# Patient Record
Sex: FEMALE | URBAN - METROPOLITAN AREA
[De-identification: names, ages, dates, MRNs, and addresses within clinical notes are randomized per-mention and may not be internally consistent; named-entity substitution may affect disease eponyms.]

---

## 2017-06-27 ENCOUNTER — HOSPITAL ENCOUNTER (INPATIENT)
Age: 22
LOS: 3 days | Discharge: HOME | End: 2017-06-30
Attending: OBSTETRICS & GYNECOLOGY | Admitting: OBSTETRICS & GYNECOLOGY
Payer: COMMERCIAL

## 2017-06-27 PROBLEM — O09.30 NO PRENATAL CARE IN CURRENT PREGNANCY: Status: ACTIVE | Noted: 2017-06-27

## 2017-06-27 LAB
ABO + RH BLD: NORMAL
ALBUMIN SERPL BCP-MCNC: 2.5 G/DL (ref 3.5–5)
ALBUMIN/GLOB SERPL: 0.7 {RATIO} (ref 1.1–2.2)
ALP SERPL-CCNC: 173 U/L (ref 45–117)
ALT SERPL-CCNC: 9 U/L (ref 12–78)
AMPHET UR QL SCN: NEGATIVE
ANION GAP BLD CALC-SCNC: 11 MMOL/L (ref 5–15)
APPEARANCE UR: ABNORMAL
AST SERPL W P-5'-P-CCNC: 12 U/L (ref 15–37)
BACTERIA URNS QL MICRO: ABNORMAL /HPF
BARBITURATES UR QL SCN: NEGATIVE
BASOPHILS # BLD AUTO: 0 K/UL (ref 0–0.1)
BASOPHILS # BLD: 0 % (ref 0–1)
BENZODIAZ UR QL: POSITIVE
BILIRUB SERPL-MCNC: 0.2 MG/DL (ref 0.2–1)
BILIRUB UR QL: NEGATIVE
BLOOD GROUP ANTIBODIES SERPL: NORMAL
BUN SERPL-MCNC: 7 MG/DL (ref 6–20)
BUN/CREAT SERPL: 13 (ref 12–20)
CALCIUM SERPL-MCNC: 8.2 MG/DL (ref 8.5–10.1)
CANNABINOIDS UR QL SCN: POSITIVE
CHLORIDE SERPL-SCNC: 108 MMOL/L (ref 97–108)
CO2 SERPL-SCNC: 19 MMOL/L (ref 21–32)
COCAINE UR QL SCN: NEGATIVE
COLOR UR: ABNORMAL
CREAT SERPL-MCNC: 0.55 MG/DL (ref 0.55–1.02)
DRUG SCRN COMMENT,DRGCM: ABNORMAL
EOSINOPHIL # BLD: 0 K/UL (ref 0–0.4)
EOSINOPHIL NFR BLD: 0 % (ref 0–7)
EPITH CASTS URNS QL MICRO: ABNORMAL /LPF
ERYTHROCYTE [DISTWIDTH] IN BLOOD BY AUTOMATED COUNT: 13.7 % (ref 11.5–14.5)
EST. AVERAGE GLUCOSE BLD GHB EST-MCNC: 111 MG/DL
GLOBULIN SER CALC-MCNC: 3.5 G/DL (ref 2–4)
GLUCOSE SERPL-MCNC: 89 MG/DL (ref 65–100)
GLUCOSE UR STRIP.AUTO-MCNC: NEGATIVE MG/DL
GRAN CASTS URNS QL MICRO: ABNORMAL /LPF
HBA1C MFR BLD: 5.5 % (ref 4.2–6.3)
HCT VFR BLD AUTO: 30.2 % (ref 35–47)
HGB BLD-MCNC: 9.4 G/DL (ref 11.5–16)
HGB UR QL STRIP: ABNORMAL
HIV1 P24 AG SERPL QL IA: NONREACTIVE
HIV1+2 AB SERPL QL IA: NONREACTIVE
KETONES UR QL STRIP.AUTO: 40 MG/DL
LEUKOCYTE ESTERASE UR QL STRIP.AUTO: NEGATIVE
LYMPHOCYTES # BLD AUTO: 6 % (ref 12–49)
LYMPHOCYTES # BLD: 0.8 K/UL (ref 0.8–3.5)
MCH RBC QN AUTO: 24.4 PG (ref 26–34)
MCHC RBC AUTO-ENTMCNC: 31.1 G/DL (ref 30–36.5)
MCV RBC AUTO: 78.2 FL (ref 80–99)
METHADONE UR QL: NEGATIVE
MONOCYTES # BLD: 0.8 K/UL (ref 0–1)
MONOCYTES NFR BLD AUTO: 5 % (ref 5–13)
NEUTS SEG # BLD: 13.6 K/UL (ref 1.8–8)
NEUTS SEG NFR BLD AUTO: 89 % (ref 32–75)
NITRITE UR QL STRIP.AUTO: NEGATIVE
OPIATES UR QL: POSITIVE
PCP UR QL: NEGATIVE
PH UR STRIP: 7 [PH] (ref 5–8)
PLATELET # BLD AUTO: 262 K/UL (ref 150–400)
POTASSIUM SERPL-SCNC: 3.8 MMOL/L (ref 3.5–5.1)
PROT SERPL-MCNC: 6 G/DL (ref 6.4–8.2)
PROT UR STRIP-MCNC: 30 MG/DL
RBC # BLD AUTO: 3.86 M/UL (ref 3.8–5.2)
RBC #/AREA URNS HPF: >100 /HPF (ref 0–5)
RPR SER QL: NONREACTIVE
SODIUM SERPL-SCNC: 138 MMOL/L (ref 136–145)
SP GR UR REFRACTOMETRY: 1.01 (ref 1–1.03)
SPECIMEN EXP DATE BLD: NORMAL
UA: UC IF INDICATED,UAUC: ABNORMAL
UROBILINOGEN UR QL STRIP.AUTO: 0.2 EU/DL (ref 0.2–1)
WBC # BLD AUTO: 15.2 K/UL (ref 3.6–11)
WBC URNS QL MICRO: ABNORMAL /HPF (ref 0–4)

## 2017-06-27 PROCEDURE — 87389 HIV-1 AG W/HIV-1&-2 AB AG IA: CPT | Performed by: OBSTETRICS & GYNECOLOGY

## 2017-06-27 PROCEDURE — 87340 HEPATITIS B SURFACE AG IA: CPT | Performed by: OBSTETRICS & GYNECOLOGY

## 2017-06-27 PROCEDURE — 65410000002 HC RM PRIVATE OB

## 2017-06-27 PROCEDURE — 80053 COMPREHEN METABOLIC PANEL: CPT | Performed by: OBSTETRICS & GYNECOLOGY

## 2017-06-27 PROCEDURE — 86592 SYPHILIS TEST NON-TREP QUAL: CPT | Performed by: OBSTETRICS & GYNECOLOGY

## 2017-06-27 PROCEDURE — 74011250637 HC RX REV CODE- 250/637: Performed by: OBSTETRICS & GYNECOLOGY

## 2017-06-27 PROCEDURE — 74011250636 HC RX REV CODE- 250/636

## 2017-06-27 PROCEDURE — 36415 COLL VENOUS BLD VENIPUNCTURE: CPT | Performed by: OBSTETRICS & GYNECOLOGY

## 2017-06-27 PROCEDURE — 87491 CHLMYD TRACH DNA AMP PROBE: CPT | Performed by: OBSTETRICS & GYNECOLOGY

## 2017-06-27 PROCEDURE — 85025 COMPLETE CBC W/AUTO DIFF WBC: CPT | Performed by: OBSTETRICS & GYNECOLOGY

## 2017-06-27 PROCEDURE — 87086 URINE CULTURE/COLONY COUNT: CPT | Performed by: OBSTETRICS & GYNECOLOGY

## 2017-06-27 PROCEDURE — 86762 RUBELLA ANTIBODY: CPT | Performed by: OBSTETRICS & GYNECOLOGY

## 2017-06-27 PROCEDURE — 87798 DETECT AGENT NOS DNA AMP: CPT | Performed by: OBSTETRICS & GYNECOLOGY

## 2017-06-27 PROCEDURE — 0HQ9XZZ REPAIR PERINEUM SKIN, EXTERNAL APPROACH: ICD-10-PCS | Performed by: OBSTETRICS & GYNECOLOGY

## 2017-06-27 PROCEDURE — 75410000003 HC RECOV DEL/VAG/CSECN EA 0.5 HR

## 2017-06-27 PROCEDURE — 86803 HEPATITIS C AB TEST: CPT | Performed by: OBSTETRICS & GYNECOLOGY

## 2017-06-27 PROCEDURE — 74011000250 HC RX REV CODE- 250

## 2017-06-27 PROCEDURE — 75410000004 HC DELIVERY OUTSIDE HOSPITAL

## 2017-06-27 PROCEDURE — 74011250636 HC RX REV CODE- 250/636: Performed by: OBSTETRICS & GYNECOLOGY

## 2017-06-27 PROCEDURE — 83036 HEMOGLOBIN GLYCOSYLATED A1C: CPT | Performed by: OBSTETRICS & GYNECOLOGY

## 2017-06-27 PROCEDURE — 80307 DRUG TEST PRSMV CHEM ANLYZR: CPT | Performed by: OBSTETRICS & GYNECOLOGY

## 2017-06-27 PROCEDURE — 81001 URINALYSIS AUTO W/SCOPE: CPT | Performed by: OBSTETRICS & GYNECOLOGY

## 2017-06-27 PROCEDURE — 86900 BLOOD TYPING SEROLOGIC ABO: CPT | Performed by: OBSTETRICS & GYNECOLOGY

## 2017-06-27 RX ORDER — OXYTOCIN 10 [USP'U]/ML
20 INJECTION, SOLUTION INTRAMUSCULAR; INTRAVENOUS ONCE
Status: COMPLETED | OUTPATIENT
Start: 2017-06-27 | End: 2017-06-27

## 2017-06-27 RX ORDER — ONDANSETRON 4 MG/1
4 TABLET, ORALLY DISINTEGRATING ORAL
Status: DISCONTINUED | OUTPATIENT
Start: 2017-06-27 | End: 2017-06-30 | Stop reason: HOSPADM

## 2017-06-27 RX ORDER — OXYTOCIN/RINGER'S LACTATE 20/1000 ML
PLASTIC BAG, INJECTION (ML) INTRAVENOUS
Status: DISPENSED
Start: 2017-06-27 | End: 2017-06-27

## 2017-06-27 RX ORDER — HYDROCORTISONE ACETATE PRAMOXINE HCL 2.5; 1 G/100G; G/100G
CREAM TOPICAL AS NEEDED
Status: DISCONTINUED | OUTPATIENT
Start: 2017-06-27 | End: 2017-06-30 | Stop reason: HOSPADM

## 2017-06-27 RX ORDER — TRAMADOL HYDROCHLORIDE 50 MG/1
50 TABLET ORAL
Status: DISCONTINUED | OUTPATIENT
Start: 2017-06-27 | End: 2017-06-30 | Stop reason: HOSPADM

## 2017-06-27 RX ORDER — ZOLPIDEM TARTRATE 5 MG/1
5 TABLET ORAL
Status: DISCONTINUED | OUTPATIENT
Start: 2017-06-27 | End: 2017-06-30 | Stop reason: HOSPADM

## 2017-06-27 RX ORDER — HYDROCORTISONE 1 %
CREAM (GRAM) TOPICAL AS NEEDED
Status: DISCONTINUED | OUTPATIENT
Start: 2017-06-27 | End: 2017-06-30 | Stop reason: HOSPADM

## 2017-06-27 RX ORDER — SODIUM CHLORIDE 0.9 % (FLUSH) 0.9 %
5-10 SYRINGE (ML) INJECTION AS NEEDED
Status: DISCONTINUED | OUTPATIENT
Start: 2017-06-27 | End: 2017-06-30 | Stop reason: HOSPADM

## 2017-06-27 RX ORDER — OXYTOCIN 10 [USP'U]/ML
INJECTION, SOLUTION INTRAMUSCULAR; INTRAVENOUS
Status: COMPLETED
Start: 2017-06-27 | End: 2017-06-27

## 2017-06-27 RX ORDER — DOCUSATE SODIUM 100 MG/1
100 CAPSULE, LIQUID FILLED ORAL
Status: DISCONTINUED | OUTPATIENT
Start: 2017-06-27 | End: 2017-06-30 | Stop reason: HOSPADM

## 2017-06-27 RX ORDER — IBUPROFEN 400 MG/1
800 TABLET ORAL EVERY 8 HOURS
Status: DISCONTINUED | OUTPATIENT
Start: 2017-06-27 | End: 2017-06-29

## 2017-06-27 RX ORDER — LIDOCAINE HYDROCHLORIDE 10 MG/ML
10 INJECTION INFILTRATION; PERINEURAL ONCE
Status: ACTIVE | OUTPATIENT
Start: 2017-06-27 | End: 2017-06-27

## 2017-06-27 RX ORDER — OXYTOCIN/RINGER'S LACTATE 20/1000 ML
125-1000 PLASTIC BAG, INJECTION (ML) INTRAVENOUS AS NEEDED
Status: DISCONTINUED | OUTPATIENT
Start: 2017-06-27 | End: 2017-06-30 | Stop reason: HOSPADM

## 2017-06-27 RX ORDER — LIDOCAINE HYDROCHLORIDE 10 MG/ML
INJECTION INFILTRATION; PERINEURAL
Status: COMPLETED
Start: 2017-06-27 | End: 2017-06-27

## 2017-06-27 RX ORDER — AMMONIA 15 % (W/V)
1 AMPUL (EA) INHALATION AS NEEDED
Status: DISCONTINUED | OUTPATIENT
Start: 2017-06-27 | End: 2017-06-30 | Stop reason: HOSPADM

## 2017-06-27 RX ORDER — SODIUM CHLORIDE, SODIUM LACTATE, POTASSIUM CHLORIDE, CALCIUM CHLORIDE 600; 310; 30; 20 MG/100ML; MG/100ML; MG/100ML; MG/100ML
125 INJECTION, SOLUTION INTRAVENOUS CONTINUOUS
Status: DISCONTINUED | OUTPATIENT
Start: 2017-06-27 | End: 2017-06-30 | Stop reason: HOSPADM

## 2017-06-27 RX ORDER — SODIUM CHLORIDE 0.9 % (FLUSH) 0.9 %
5-10 SYRINGE (ML) INJECTION EVERY 8 HOURS
Status: DISCONTINUED | OUTPATIENT
Start: 2017-06-27 | End: 2017-06-30 | Stop reason: HOSPADM

## 2017-06-27 RX ORDER — DIPHENHYDRAMINE HCL 25 MG
25 CAPSULE ORAL
Status: DISCONTINUED | OUTPATIENT
Start: 2017-06-27 | End: 2017-06-30 | Stop reason: HOSPADM

## 2017-06-27 RX ORDER — SIMETHICONE 80 MG
80 TABLET,CHEWABLE ORAL
Status: DISCONTINUED | OUTPATIENT
Start: 2017-06-27 | End: 2017-06-30 | Stop reason: HOSPADM

## 2017-06-27 RX ORDER — ACETAMINOPHEN 325 MG/1
650 TABLET ORAL
Status: DISCONTINUED | OUTPATIENT
Start: 2017-06-27 | End: 2017-06-30 | Stop reason: HOSPADM

## 2017-06-27 RX ADMIN — IBUPROFEN 800 MG: 400 TABLET, FILM COATED ORAL at 20:05

## 2017-06-27 RX ADMIN — OXYTOCIN 20 UNITS: 10 INJECTION INTRAVENOUS at 09:56

## 2017-06-27 RX ADMIN — SODIUM CHLORIDE, SODIUM LACTATE, POTASSIUM CHLORIDE, AND CALCIUM CHLORIDE 125 ML/HR: 600; 310; 30; 20 INJECTION, SOLUTION INTRAVENOUS at 09:45

## 2017-06-27 RX ADMIN — OXYTOCIN 20 UNITS: 10 INJECTION, SOLUTION INTRAMUSCULAR; INTRAVENOUS at 09:56

## 2017-06-27 RX ADMIN — TRAMADOL HYDROCHLORIDE 50 MG: 50 TABLET, FILM COATED ORAL at 14:55

## 2017-06-27 RX ADMIN — LIDOCAINE HYDROCHLORIDE: 10 INJECTION, SOLUTION INFILTRATION; PERINEURAL at 10:15

## 2017-06-27 RX ADMIN — IBUPROFEN 800 MG: 400 TABLET, FILM COATED ORAL at 12:02

## 2017-06-27 NOTE — LACTATION NOTE
This note was copied from a baby's chart. This mother has been advised not to breastfeed due to:  Substance abuse and/or alcohol abuse,  Cannabis, Benzodiazepine, opiates  Mother may choose to pump and dump her milk until she is drug free, approximately one month   Mother agrees to formula feed at this time. Parents will explore feeding goals and communicate with staff.

## 2017-06-27 NOTE — IP AVS SNAPSHOT
[address] 
[phone] Patient: Stefano Bowman MRN: [id number] You are allergic to the following No active allergies Immunizations Administered for This Admission Name Date MMR  Deferred () Tdap 6/28/2017 Recent Documentation Height Weight Breastfeeding? BMI OB Status Smoking Status 1.575 m 73.9 kg Unknown 29.81 kg/m2 Recent pregnancy Current Every Day Smoker Emergency Contacts Name Discharge Info Relation Home Work Mobile Corie Longoria About your hospitalization You were admitted on:  June 27, 2017 You last received care in the:  [address] You were discharged on:  June 30, 2017 Unit phone number:  [phone] Why you were hospitalized Your primary diagnosis was:  Not on File Your diagnoses also included:  No Prenatal Care In Current Pregnancy Providers Seen During Your Hospitalizations Provider Role Specialty Primary office phone Lamonte Vasquez MD Attending Provider Obstetrics & Gynecology [phone] Your Primary Care Physician (PCP) Primary Care Physician Office Phone Office Fax NONE ** None ** ** None ** Follow-up Information Follow up With Details Comments Contact Info None   None ([phone]) Patient stated that they have no PCP [facility] (Early Intervention) Call in 2 weeks Early Intervention services for infant. Please call if you have not heard from [facility] within two weeks. [phone] THE [facility] office  Schedule an appointment as soon as possible for a visit Call to schedule [facility] appointment for nutrition program.  [address] [phone]  [facility]  Schedule an appointment as soon as possible for a visit Please contact to schedule substance use counseling if needed. Mary Ann Calloway, [place], [address] Phone: [phone] [facility]  Call as needed with questions regarding Medicaid status. [phone] Jed Farah MD Schedule an appointment as soon as possible for a visit in 6 weeks  [address] [facility] [place] 
[phone] Current Discharge Medication List  
  
START taking these medications Dose & Instructions Dispensing Information Comments Morning Noon Evening Bedtime  
 ibuprofen 800 mg tablet Commonly known as:  MOTRIN Your last dose was: Your next dose is:    
   
   
 Dose:  800 mg Take 1 Tab by mouth every six (6) hours as needed. Quantity:  30 Tab Refills:  0 Where to Get Your Medications Information on where to get these meds will be given to you by the nurse or doctor. ! Ask your nurse or doctor about these medications  
  ibuprofen 800 mg tablet Discharge Instructions POSTPARTUM DISCHARGE INSTRUCTIONS Name:  On license of [facility] YOB: 1995 Admission Diagnosis:  No prenatal care in current pregnancy, third trimester Discharge Diagnosis:   
Problem List as of 6/30/2017  Never Reviewed Codes Class Noted - Resolved No prenatal care in current pregnancy ICD-10-CM: O09.30 ICD-9-CM: V23.7  6/27/2017 - Present Attending Physician:  Gentry Schuster MD 
 
Delivery Type:  Vaginal Childbirth: What To Expect At Home Your Recovery: Your body will slowly heal in the next few weeks. It is easy to get too tired and overwhelmed during the first weeks after your baby is born. Changes in your hormones can shift your mood without warning. You may find it hard to meet the extra demands on your energy and time. Take it easy on yourself. Follow-up care is a key part of your treatment and safety. Be sure to make and go to all appointments, and call your doctor if you are having problems. It's also a good idea to know your test results and keep a list of the medicines you take. How can you care for yourself at home? Vaginal bleeding and cramps · After delivery, you will have a bloody discharge from the vagina. This will turn pink within a week and then white or yellow after about 10 days. It may last for 2 to 4 weeks or longer, until the uterus has healed. Use pads instead of tampons until you stop bleeding. · Do not worry if you pass some blood clots, as long as they are smaller than a golf ball. If you have a tear or stitches in your vaginal area, change the pad at least every 4 hours to prevent soreness and infection. · You may have cramps for the first few days after childbirth. These are normal and occur as the uterus shrinks to normal size. Take an over-the-counter pain medicine, such as acetaminophen (Tylenol), ibuprofen (Advil, Motrin), or naproxen (Aleve), for cramps. Read and follow all instructions on the label. Do not take aspirin, because it can cause more bleeding. Do not take acetaminophen (Tylenol) and other acetaminophen containing medications (i.e. Percocet) at the same time. Breast fullness · Your breasts may overfill (engorge) in the first few days after delivery. To help milk flow and to relieve pain, warm your breasts in the shower or by using warm, moist towels before nursing. · If you are not nursing, do not put warmth on your breasts or touch your breasts. Wear a tight bra or sports bra and use ice until the fullness goes away. This usually takes 2 to 3 days. · Put ice or a cold pack on your breast after nursing to reduce swelling and pain. Put a thin cloth between the ice and your skin. Activity · Eat a balanced diet. Do not try to lose weight by cutting calories.  Keep taking your prenatal vitamins, or take a multivitamin. · Get as much rest as you can. Try to take naps when your baby sleeps during the day. · Get some exercise every day. But do not do any heavy exercise until your doctor says it is okay. · Wait until you are healed (about 4 to 6 weeks) before you have sexual intercourse. Your doctor will tell you when it is okay to have sex. · Talk to your doctor about birth control. You can get pregnant even before your period returns. Also, you can get pregnant while you are breast-feeding. Mental Health · Many women get the \"baby blues\" during the first few days after childbirth. You may lose sleep, feel irritable, and cry easily. You may feel happy one minute and sad the next. Hormone changes are one cause of these emotional changes. Also, the demands of a new baby, along with visits from relatives or other family needs, add to a mother's stress. The \"baby blues\" often peak around the fourth day. Then they ease up in less than 2 weeks. · If your moodiness or anxiety lasts for more than 2 weeks, or if you feel like life is not worth living, you may have postpartum depression. This is different for each mother. Some mothers with serious depression may worry intensely about their infant's well-being. Others may feel distant from their child. Some mothers might even feel that they might harm their baby. A mother may have signs of paranoia, wondering if someone is watching her. · With all the changes in your life, you may not know if you are depressed. Pregnancy sometimes causes changes in how you feel that are similar to the symptoms of depression. · Symptoms of depression include: · Feeling sad or hopeless and losing interest in daily activities. These are the most common symptoms of depression. · Sleeping too much or not enough. · Feeling tired. You may feel as if you have no energy. · Eating too much or too little. · POSTPARTUM SUPPORT INTERNATIONAL (PSI) offers a Warm line; Chat with the Expert phone sessions; Information and Articles about Pregnancy and Postpartum Mood Disorders; Comprehensive List of Free Support Groups; Knowledgeable local coordinators who will offer support, information, and resources; Guide to Resources on Shoot it!; Calendar of events in the  mood disorders community; Latest News and Research; and [facility] [address] for [employer]. Remember - You are not alone; You are not to blame; With help, you will be well. 1-118-869-PPD(7607). WWW. POSTPARTUM. NET · Writing or talking about death, such as writing suicide notes or talking about guns, knives, or pills. Keep the numbers for these national suicide hotlines: 2-362-582-TALK (7-311.426.2777) and 0-462-LEDDDJX (4-108.537.5942). If you or someone you know talks about suicide or feeling hopeless, get help right away. Constipation and Hemorrhoids · Drink plenty of fluids, enough so that your urine is light yellow or clear like water. If you have kidney, heart, or liver disease and have to limit fluids, talk with your doctor before you increase the amount of fluids you drink. · Eat plenty of fiber each day. Have a bran muffin or bran cereal for breakfast, and try eating a piece of fruit for a mid-afternoon snack. · For painful, itchy hemorrhoids, put ice or a cold pack on the area several times a day for 10 minutes at a time. Follow this by putting a warm compress on the area for another 10 to 20 minutes or by sitting in a shallow, warm bath. When should you call for help? Call 911 anytime you think you may need emergency care. For example, call if: 
· You are thinking of hurting yourself, your baby, or anyone else. · You passed out (lost consciousness). · You have symptoms of a blood clot in your lung (called a pulmonary embolism). These may include:   
· Sudden chest pain. · Trouble breathing. · Coughing up blood. Call your doctor now or seek immediate medical care if: 
· You have severe vaginal bleeding. · You are soaking through a pad each hour for 2 or more hours. · Your vaginal bleeding seems to be getting heavier or is still bright red 4 days after delivery. · You are dizzy or lightheaded, or you feel like you may faint. · You are vomiting or cannot keep fluids down. · You have a fever. · You have new or more belly pain. · You pass tissue (not just blood). · Your vaginal discharge smells bad. · Your belly feels tender or full and hard. · Your breasts are continuously painful or red. · You feel sad, anxious, or hopeless for more than a few days. · You have sudden, severe pain in your belly. · You have symptoms of a blood clot in your leg (called a deep vein thrombosis),  
       such as: 
· Pain in your calf, back of the knee, thigh, or groin. · Redness and swelling in your leg or groin. · You have symptoms of preeclampsia, such as: 
· Sudden swelling of your face, hands, or feet. · New vision problems (such as dimness or blurring). · A severe headache. · Your blood pressure is higher than it should be or rises suddenly. · You have new nausea or vomiting. Watch closely for changes in your health, and be sure to contact your doctor if you have any problems. Additional Information:  Postpartum Support PARENTS:  Are you feeling sad or depressed? Is it difficult for you to enjoy yourself? Do you feel more irritable or tense? Do you feel anxious or panicky? Are you having difficulty bonding with your baby? Do you feel as if you are \"out of control\" or \"going crazy\"? Are you worried that you might hurt your baby or yourself? FAMILIES: Do you worry that something is wrong but don't know how to help? Do you think that your partner or spouse is having problems coping? Are you worried that it may never get better?   
 
While many women experience some mild mood change or \"the blues\" during or after the birth of a child, 1 in 9 women experience more significant symptoms of depression or anxiety. 1 in 10 Dads become depressed during the first year. Things you can do Being a good parent includes taking care of yourself. If you take care of yourself, you will be able to take better care of your baby and your family. · Talk to a counselor or healthcare provider who has training in  mood and anxiety problems. · Learn as much as you can about pregnancy and postpartum depression and anxiety. · Get support from family and friends. Ask for help when you need it. · Join a support group in your area or online. · Keep active by walking, stretching or whatever form of exercise helps you to feel better. · Get enough rest and time for yourself. · Eat a healthy diet. · Don't give up! It may take more than one try to get the right help you need. These are general instructions for a healthy lifestyle: No smoking/ No tobacco products/ Avoid exposure to second hand smoke Surgeon General's Warning:  Quitting smoking now greatly reduces serious risk to your health. Obesity, smoking, and sedentary lifestyle greatly increases your risk for illness A healthy diet, regular physical exercise & weight monitoring are important for maintaining a healthy lifestyle Recognize signs and symptoms of STROKE: 
 
F-face looks uneven A-arms unable to move or move unevenly S-speech slurred or non-existent T-time-call 911 as soon as signs and symptoms begin - DO NOT go  
    back to bed or wait to see if you get better - TIME IS BRAIN. I have had the opportunity to make my options or choices for discharge. I have received and understand these instructions. Discharge Orders None [facility] Announcement We are excited to announce that we are making your provider's discharge notes available to you in [facility]ali[facility].   You will see these notes when they are completed and signed by the physician that discharged you from your recent hospital stay. If you have any questions or concerns about any information you see in Hapara, please call the Health Information Department where you were seen or reach out to your Primary Care Provider for more information about your plan of care. Introducing [address]! Alirio Cruz introduces Hapara patient portal. Now you can access parts of your medical record, email your doctor's office, and request medication refills online. 1. In your internet browser, go to [email or web address] 2. Click on the First Time User? Click Here link in the Sign In box. You will see the New Member Sign Up page. 3. Enter your Hapara Access Code exactly as it appears below. You will not need to use this code after youve completed the sign-up process. If you do not sign up before the expiration date, you must request a new code. · Hapara Access Code: [email or web address] Expires: 9/28/2017 11:06 AM 
 
4. Enter the last four digits of your Social Security Number (xxxx) and Date of Birth (mm/dd/yyyy) as indicated and click Submit. You will be taken to the next sign-up page. 5. Create a Hapara ID. This will be your Hapara login ID and cannot be changed, so think of one that is secure and easy to remember. 6. Create a Hapara password. You can change your password at any time. 7. Enter your Password Reset Question and Answer. This can be used at a later time if you forget your password. 8. Enter your e-mail address. You will receive e-mail notification when new information is available in [address] 9. Click Sign Up. You can now view and download portions of your medical record. 10. Click the Download Summary menu link to download a portable copy of your medical information.  
 
If you have questions, please visit the Frequently Asked Questions section of the [facility]. Remember, MyChart is NOT to be used for urgent needs. For medical emergencies, dial 911. Now available from your iPhone and Android! General Information Please provide this summary of care documentation to your next provider. Patient Signature:  ____________________________________________________________ Date:  ____________________________________________________________  
  
Bibiana John Provider Signature:  ____________________________________________________________ Date:  ____________________________________________________________

## 2017-06-27 NOTE — PROGRESS NOTES
Chart reviewed. CM consult received. Pt admitted earlier today s/p home delivery of infant in bathroom toilet. No prenatal care. Per chart review and staff, pt was unaware of pregnancy. Met with pt and FOWIL Atul Owens [phone]) at the bedside this PM to introduce role of case management, offer support ,and address needs of mother/infant post discharge. Pt is 24years old and has been living with her boyfriend/FOB in an apartment on [address]. Pt stated she is currently not in school nor is she working. Pt is on her parent's insurance plan [employer]). Pt plans to add infant to Medicaid policy- [employer] worker has already met with pt to discuss this and pt signed consent. CASSANDRA Da Silva is a full time student at [employer], [address] [facility]. He works at [employer] and [employer] part time (full time during the summer). Trung Da Silva already reached out to employer to request the next few days off of work to be with pt/infant. Trung Da Silva is moving to a new apartment ([address]) and pt plans to return to her parent's house post discharge with infant. ([address], [address] [phone]). Pt identifies support from her parents, two sisters, grandmother, and cousins. Maternal family has been present at the bedside since admission. CASSANDRA's parents are supportive- Trung Da Silva stated Charisma Perches are disappointed but that they will always support me no matter what. \" CASSANDRA's parents live in the [facility]. Pt reports that she did not know she was pregnant. Pt stated she has been having regular menstrual cycles for the past few months. Pt stated that she had gained weight but did not suspect pregnancy. She also noted bouts of nausea periodically throughout the past couple of months, but reported it was \"clear foam\". No contraception used. Pt stated late last night she experienced severe low back pain and started her menstrual cycle. This morning she took a Morphine 60MG tablet to alleviate her pain.  CM confirmed that pt is not prescribed to Morphine or Xanax. When asked where pt obtained these medications, she stated she got it from a random person. Pt reported last using Morphine tablet this AM and reports taking 2-3 tablets recently. Pt also confirmed she had taken a Xanax a few days ago. FOB also spoke up and stated that he takes Xanax as well. Pt admits to smoking marijuana daily. CM informed pt/FOB of protocol for urine drug screening for mother/infant. CM will make referral to [address] [place] [address] for pt and continue to monitor infant's screening. If infant's screen is positive, will contact CPS and file report. Pt/FOB aware of this. Emotional support given. CM provided opportunity for questions/concerns. None voiced at this time. Pt is considering breastfeeding infant as she did so this morning. CM advised pt to contact her insurance company to inquire about a breastpump as soon as possible. Pt's cousin plans to give pt/FOB a car seat that her infant has outgrown. Disposition plan is for mother/infant to return to maternal grandparent's home. Pt is unsure of how long she will stay there. CM offered additional resources/support to consider: Family Lifeline and [employer]. CM will follow up with pt in AM to continue discussion and assist with establishing pediatrician. Care Management Interventions  Mode of Transport at Discharge: Other (see comment) (family by car)  Transition of Care Consult (CM Consult): Discharge Planning  MyChart Signup: No  Discharge Durable Medical Equipment: No  Health Maintenance Reviewed: Yes  Physical Therapy Consult: No  Occupational Therapy Consult: No  Speech Therapy Consult: No  Current Support Network:  Other, Family [facility] [place] (has been living with boyfriend- will be moving back to [facility] house )  Confirm Follow Up Transport: Family  Plan discussed with Pt/Family/Caregiver: Yes  Freedom of Choice Offered: Yes  Discharge Location  Discharge Placement: Home with family assistance    Mayelin Men, MSW

## 2017-06-27 NOTE — ROUTINE PROCESS
TRANSFER - IN REPORT:    Verbal report received from CHRISTIANO Jc RN on [employer]  being received from L&D for routine progression of care      Report consisted of patients Situation, Background, Assessment and   Recommendations(SBAR). Information from the following report(s) SBAR was reviewed with the receiving nurse. Opportunity for questions and clarification was provided. Assessment completed upon patients arrival to unit and care assumed. Hourly rounds completed from [id number].

## 2017-06-27 NOTE — H&P
Labor and Delivery Admission Note  6/27/2017    24 y.o. female, G1 P 1 @ uncertain GA secondary to unknown pregnancy and no prenatal care presented via EMS @ 6233 having delivered at home. Patient reports having regular bleeding episodes monthly usually around the 28th of the month. At the prompting of her mother secondary to some weight gain a few months ago, she did 3 home pregnancy tests, all of which were negative. She noted some heavier bleeding yesterday associated with back pain. She took some morphine from a \"friend\". The back pain progressed and she went to have a BM this AM and delivered a crying baby into the toilet. Her BF took the baby out of the toilet and called 911. Patient presents to L&D now with placenta still in utero,complaining of cramping. PNC: no PNC    PMH:  Negative    PSH:  Negative    Meds: occ ibuprofen    All:  NKDA    SH:  Lives with her BF Halie Benavides); works at [employer]; smokes tobacco and marijuana; she has occ taken morphine from friends; denies any other drug use; has rare alcohol intake    GYN:  Denies any h/o STDs      History reviewed. No pertinent past medical history. History reviewed. No pertinent surgical history. OB/GYN: none  Meds:   Current Facility-Administered Medications   Medication Dose Route Frequency    oxytocin in lactated ringers 20 unit/1,000 mL infusion soln        lidocaine (XYLOCAINE) 10 mg/mL (1 %) injection 10 mL  10 mL SubCUTAneous ONCE    lactated Ringers infusion  125 mL/hr IntraVENous CONTINUOUS     Allergies: No Known Allergies  Pertinent ROS: as per HPI   History reviewed. No pertinent family history. Social History     Social History    Marital status: SINGLE     Spouse name: N/A    Number of children: N/A    Years of education: N/A     Occupational History    Not on file.      Social History Main Topics    Smoking status: Current Every Day Smoker     Packs/day: 0.25    Smokeless tobacco: Never Used    Alcohol use No    Drug use: 7.00 per week     Special: Marijuana      Comment: daily    Sexual activity: Yes     Partners: Male     Other Topics Concern    Not on file     Social History Narrative       OBJECTIVE:  Gravid female NAD  Temp (24hrs), Av.4 °F (36.9 °C), Min:98.4 °F (36.9 °C), Max:98.4 °F (36.9 °C)    Visit Vitals    /87    Pulse 95    Temp 98.4 °F (36.9 °C)    Resp 16    Ht 5' 2\" (1.575 m)    Wt 73.9 kg (163 lb)    BMI 29.81 kg/m2       Labs:  No results found for: WBC    Exam:  HEENT:  normal   Lungs:  Normal work of breathing  Cor:  Normal perfusion  Abdomen:  Soft, firm fundus, nontender  :  Placenta delivered, intact, 3VC          Several hemostatic abrasions          1 1st degree vaginal lac, nonhemostatic, repaired with 3.0 monocryl    Impression:  PPD 0  at home; delivery of placenta with vag repair in hospital    --SW consult  --PNL drawn with UDS  --Patient was in shock but very appropriate with baby; hasn't had a chance to consider adoption vs keeping the baby; she has  and is considering names  --otherwise routine PP care     Daly Ayers MD

## 2017-06-27 NOTE — PROGRESS NOTES
Bedside and Verbal shift change report given to [address] [place], [place] [address]way 951 (oncoming nurse) by Jamil Campos RN (offgoing nurse). Report included the following information SBAR, Kardex and MAR.     1700-patient ambulated to bathroom, gait steady. Voided a large amount of clear yellow urine. Educated on [employer] and pericare completed.      2000-hourly rounds completed from 3770-4787    2400-hourly rounds completed from 3049-7865

## 2017-06-27 NOTE — PROGRESS NOTES
0939-pt here from home, pt was unaware of pregnancy, delivered baby vaginally in bathroom toilet at home, boyfriend quickly retreived baby and called 46, baby cried immediately, baby arrived pink and vigorous, carried by paramedic  0944-placenta delivered by Dr Edwin Gregorio  1000-Repair of first degree tear with lidocaine  1030-breastfeeding well, hand expressed milk at well for baby at breast  1100-family at side  1130- up to void and collect urine specimen, pericare done for collection, new ice pack, gait steady  1207-to MIU in wheelchair, pt has no complaints, family and baby at side  1210-baby to nursery for pediatrician visit and warmer  1215-TRANSFER - OUT REPORT:    Verbal report given to Marichuy Whitten RN(name) on Cezar Rodriguez  being transferred to [facility]unit) for routine progression of care       Report consisted of patients Situation, Background, Assessment and   Recommendations(SBAR). Information from the following report(s) SBAR, Intake/Output, Accordion and Recent Results was reviewed with the receiving nurse. Lines:       Opportunity for questions and clarification was provided.       Patient transported with:   Registered Nurse

## 2017-06-27 NOTE — IP AVS SNAPSHOT
Current Discharge Medication List  
  
START taking these medications Dose & Instructions Dispensing Information Comments Morning Noon Evening Bedtime  
 ibuprofen 800 mg tablet Commonly known as:  MOTRIN Your last dose was: Your next dose is:    
   
   
 Dose:  800 mg Take 1 Tab by mouth every six (6) hours as needed. Quantity:  30 Tab Refills:  0 Where to Get Your Medications Information on where to get these meds will be given to you by the nurse or doctor. ! Ask your nurse or doctor about these medications  
  ibuprofen 800 mg tablet

## 2017-06-28 LAB
BACTERIA SPEC CULT: NORMAL
C TRACH RRNA SPEC QL NAA+PROBE: POSITIVE
CC UR VC: NORMAL
HBV SURFACE AG SER QL: <0.1 INDEX
HBV SURFACE AG SER QL: NEGATIVE
HCV AB SERPL QL IA: NONREACTIVE
HCV COMMENT,HCGAC: NORMAL
N GONORRHOEA RRNA SPEC QL NAA+PROBE: NEGATIVE
RUBV IGG SER-IMP: NORMAL
RUBV IGG SERPL IA-ACNC: 8.4 IU/ML
SERVICE CMNT-IMP: NORMAL
SPECIMEN SOURCE: ABNORMAL
SPECIMEN SOURCE: NORMAL
VZV DNA SPEC QL NAA+PROBE: NEGATIVE

## 2017-06-28 PROCEDURE — 65410000002 HC RM PRIVATE OB

## 2017-06-28 PROCEDURE — 90715 TDAP VACCINE 7 YRS/> IM: CPT | Performed by: OBSTETRICS & GYNECOLOGY

## 2017-06-28 PROCEDURE — 3E0234Z INTRODUCTION OF SERUM, TOXOID AND VACCINE INTO MUSCLE, PERCUTANEOUS APPROACH: ICD-10-PCS | Performed by: OBSTETRICS & GYNECOLOGY

## 2017-06-28 PROCEDURE — 74011250637 HC RX REV CODE- 250/637: Performed by: OBSTETRICS & GYNECOLOGY

## 2017-06-28 PROCEDURE — 74011250636 HC RX REV CODE- 250/636: Performed by: OBSTETRICS & GYNECOLOGY

## 2017-06-28 RX ADMIN — TRAMADOL HYDROCHLORIDE 50 MG: 50 TABLET, FILM COATED ORAL at 17:20

## 2017-06-28 RX ADMIN — TRAMADOL HYDROCHLORIDE 50 MG: 50 TABLET, FILM COATED ORAL at 01:09

## 2017-06-28 RX ADMIN — TETANUS TOXOID, REDUCED DIPHTHERIA TOXOID AND ACELLULAR PERTUSSIS VACCINE, ADSORBED 0.5 ML: 5; 2.5; 8; 8; 2.5 SUSPENSION INTRAMUSCULAR at 22:05

## 2017-06-28 RX ADMIN — IBUPROFEN 800 MG: 400 TABLET, FILM COATED ORAL at 22:04

## 2017-06-28 RX ADMIN — IBUPROFEN 800 MG: 400 TABLET, FILM COATED ORAL at 13:49

## 2017-06-28 RX ADMIN — IBUPROFEN 800 MG: 400 TABLET, FILM COATED ORAL at 05:20

## 2017-06-28 RX ADMIN — TRAMADOL HYDROCHLORIDE 50 MG: 50 TABLET, FILM COATED ORAL at 23:21

## 2017-06-28 NOTE — ROUTINE PROCESS
Bedside shift change report given to Isidra Mayers RN (oncoming nurse) by C. [address] [facility] [address], RN (offgoing nurse). Report included the following information SBAR, Kardex, Procedure Summary, Intake/Output, MAR and Recent Results. Hourly rounds completed from [address]. Hourly rounds completed from 3520-8481.

## 2017-06-28 NOTE — PROGRESS NOTES
0800 Received report from Gabriella Arzate RN using sbar format    Hourly rounds completed [id number]  Hourly rounds completed [id number]

## 2017-06-28 NOTE — PROGRESS NOTES
Post Partum Day #1- Vaginal delivery at home. Patient doing well post-partum without significant complaint. Voiding without difficulty, normal lochia. Trying to breast feed. Vital signs stable, afebrile. Exam:  Patient without distress. Abdomen soft, fundus firm at level of umbilicus, nontender               Perineum - intact with normal lochia               Lower extremities- mild edema but negative Adonay's sign   Breasts- not engorged    Labs: N/A    Impression: PPD #1 S/P  doing well    Assessment and Plan:  Patient appears to be having uncomplicated post-partum course. Continue routine perineal care and maternal education. Plan discharge tomorrow if no problems occur. Blood type checked.      Ralph Solis MD

## 2017-06-28 NOTE — PROGRESS NOTES
CM noted infant's urine screen was positive for THC and opioids. CM made referral to [place] [address] for pt due to substance abuse/exposure and then contacted [facility] on behalf of infant due to exposure. CM spoke with  who stated that due to pt living in the [address] [place] [address], this would be an appropriate referral for the [facility] rather than [facility]. [facility] to send referral information to the [address] [place] [address] department of . CM met with pt/boyfriend at the bedside this PM for follow up and to explain protocol. Pt is doing well and per staff appropriate with care of infant. Verified that pt still plans to return to her parent's home in [address] at time of discharge with infant. Pt states this will be her permanent residence. FOB will return to his apartment. CM inquired about additional support/resources discussed yesterday. Pt stated that she had not thought about that yet. Emotional support given. CM inquired about obtaining a car seat. Per pt, her cousin will be bringing car seat to hospital today between 2806-6928. Pt anticipates to discharge home tomorrow. Infant qualifies for early intervention services. CM will send referral post discharge to [facility] as we need discharge summary. Will continue to follow.      ESPINOZA Polanco

## 2017-06-28 NOTE — ROUTINE PROCESS
Patient had elevated BP of 140/100. Called On Call MD.  Dr. Viola Lilly advised to give Motrin as ordered, wait one hour and recheck BP. If diastolic >596, report back to MD and collect labs - CMP, CBC, and Protein/Creatinine ratio.    0600 - Repeat BP within normal limits.

## 2017-06-29 PROCEDURE — 74011250637 HC RX REV CODE- 250/637: Performed by: OBSTETRICS & GYNECOLOGY

## 2017-06-29 PROCEDURE — 65410000002 HC RM PRIVATE OB

## 2017-06-29 RX ORDER — AZITHROMYCIN 250 MG/1
1000 TABLET, FILM COATED ORAL
Status: COMPLETED | OUTPATIENT
Start: 2017-06-29 | End: 2017-06-29

## 2017-06-29 RX ORDER — IBUPROFEN 400 MG/1
800 TABLET ORAL
Status: DISCONTINUED | OUTPATIENT
Start: 2017-06-29 | End: 2017-06-30 | Stop reason: HOSPADM

## 2017-06-29 RX ADMIN — IBUPROFEN 800 MG: 400 TABLET, FILM COATED ORAL at 07:45

## 2017-06-29 RX ADMIN — TRAMADOL HYDROCHLORIDE 50 MG: 50 TABLET, FILM COATED ORAL at 13:05

## 2017-06-29 RX ADMIN — TRAMADOL HYDROCHLORIDE 50 MG: 50 TABLET, FILM COATED ORAL at 21:02

## 2017-06-29 RX ADMIN — TRAMADOL HYDROCHLORIDE 50 MG: 50 TABLET, FILM COATED ORAL at 05:55

## 2017-06-29 RX ADMIN — AZITHROMYCIN 1000 MG: 250 TABLET, FILM COATED ORAL at 13:05

## 2017-06-29 RX ADMIN — IBUPROFEN 800 MG: 400 TABLET, FILM COATED ORAL at 21:02

## 2017-06-29 NOTE — PROGRESS NOTES
Post Partum Day #2-  outside of hospital    Doing welll, voiding without difficulty and good pain control; pt reports she is doing well with no cigarettes and plans to not go back to smoking tobacco or marijuana; she does want to breast feed and therefore wants to pump & dump for the time frame recommended by lactation; she & Shahbazguilherme iDaz are adjusting well to [place] [employer] and they state that their families are still in shock but seem to be adjusting well too      PNL: chlamydia positive           Rubella equivocal    VSS/AF    Gen-NAD  Abd- FF, NT below umbilicus  Extrem- negative for edema or Adonay's    Impression- PPD #2 S/P     --I spoke with patient in the absence of Shahbaz Diaz, her BF, re: positive Chlamydia.  Patient states that she was last tested at age 25 years and was negative but did have a partner after that and prior to Shahbaz Diaz; she states that she has only been with him for last 3 years; she does not think he has ever been tested but was under the assumption that they were monogamous; she does recall an episode of penile discharge from him about a year ago that resolved and she thought nothing more of it; she will discuss the positive chlamydia result with him (I offered to be present with her if she wanted); he will need to be treated as well; pt was tearful; Azithromycin 1 g for her now  --MMR prior to discharge for rubella equivocal result  --Asked RN for pump so patient can begin pump/dump to encourage milk production  --Encouraged complete smoking/drug use cessation  --Working on bottle feeding (per pt lots of vomiting with formula)/[facility] teaching/etc  --Plan discharge tomorrow  --Advised pt have follow up with either [facility] or [facility]; she chooses Dr. Richard Stover with Dg Dawson MD

## 2017-06-29 NOTE — ROUTINE PROCESS
Bedside shift change report given to SIVA Cueto RN (oncoming nurse) by Chasity Ford. Alfred Layton RN (offgoing nurse). Report included the following information SBAR.     Hourly Rounding Completed [id number]

## 2017-06-29 NOTE — ROUTINE PROCESS
2000- Bedside shift change report given to MEJIA Covarrubias RN (oncoming nurse) by Lilly Guido. JERICHO Ford (offgoing nurse). Report included the following information SBAR. Hourly rounds completed from 2000 to 0000. Hourly rounds completed from 0000 to 0400. Hourly rounds completed from 0400 to 0800.

## 2017-06-30 VITALS
BODY MASS INDEX: 30 KG/M2 | HEIGHT: 62 IN | SYSTOLIC BLOOD PRESSURE: 138 MMHG | TEMPERATURE: 98 F | DIASTOLIC BLOOD PRESSURE: 98 MMHG | HEART RATE: 82 BPM | RESPIRATION RATE: 14 BRPM | WEIGHT: 163 LBS

## 2017-06-30 PROCEDURE — 74011250637 HC RX REV CODE- 250/637: Performed by: OBSTETRICS & GYNECOLOGY

## 2017-06-30 RX ORDER — IBUPROFEN 800 MG/1
800 TABLET ORAL
Qty: 30 TAB | Refills: 0 | Status: SHIPPED | OUTPATIENT
Start: 2017-06-30

## 2017-06-30 RX ADMIN — IBUPROFEN 800 MG: 400 TABLET, FILM COATED ORAL at 07:14

## 2017-06-30 RX ADMIN — IBUPROFEN 800 MG: 400 TABLET, FILM COATED ORAL at 13:30

## 2017-06-30 NOTE — PROGRESS NOTES
Discharge noted. Met with mom to provide resources for parenting classes as requested. CM provided pt with the followin)[employer] brochure and contact information for parenting classes  2) Contact information for [address] for counseling services   3) Contact information for [facility] [address]  4) Contact information for [employer] regarding status of Medicaid. CM provided opportunity for questions/concerns. None voiced at this time. Pt will discharge home to her parents house in [place], [place]. No barriers to discharge identified.     ESPINOZA Pinto

## 2017-06-30 NOTE — DISCHARGE INSTRUCTIONS
POSTPARTUM DISCHARGE INSTRUCTIONS       Name:  Marci Reynoso  YOB: 1995  Admission Diagnosis:  No prenatal care in current pregnancy, third trimester     Discharge Diagnosis:    Problem List as of 6/30/2017  Never Reviewed          Codes Class Noted - Resolved    No prenatal care in current pregnancy ICD-10-CM: O09.30  ICD-9-CM: V23.7  6/27/2017 - Present            Attending Physician:  Marino Macias MD    Delivery Type:  Vaginal Childbirth: What To Expect At Home    Your Recovery: Your body will slowly heal in the next few weeks. It is easy to get too tired and overwhelmed during the first weeks after your baby is born. Changes in your hormones can shift your mood without warning. You may find it hard to meet the extra demands on your energy and time. Take it easy on yourself. Follow-up care is a key part of your treatment and safety. Be sure to make and go to all appointments, and call your doctor if you are having problems. It's also a good idea to know your test results and keep a list of the medicines you take. How can you care for yourself at home? Vaginal bleeding and cramps  · After delivery, you will have a bloody discharge from the vagina. This will turn pink within a week and then white or yellow after about 10 days. It may last for 2 to 4 weeks or longer, until the uterus has healed. Use pads instead of tampons until you stop bleeding. · Do not worry if you pass some blood clots, as long as they are smaller than a golf ball. If you have a tear or stitches in your vaginal area, change the pad at least every 4 hours to prevent soreness and infection. · You may have cramps for the first few days after childbirth. These are normal and occur as the uterus shrinks to normal size. Take an over-the-counter pain medicine, such as acetaminophen (Tylenol), ibuprofen (Advil, Motrin), or naproxen (Aleve), for cramps. Read and follow all instructions on the label.  Do not take aspirin, because it can cause more bleeding. Do not take acetaminophen (Tylenol) and other acetaminophen containing medications (i.e. Percocet) at the same time. Breast fullness  · Your breasts may overfill (engorge) in the first few days after delivery. To help milk flow and to relieve pain, warm your breasts in the shower or by using warm, moist towels before nursing. · If you are not nursing, do not put warmth on your breasts or touch your breasts. Wear a tight bra or sports bra and use ice until the fullness goes away. This usually takes 2 to 3 days. · Put ice or a cold pack on your breast after nursing to reduce swelling and pain. Put a thin cloth between the ice and your skin. Activity  · Eat a balanced diet. Do not try to lose weight by cutting calories. Keep taking your prenatal vitamins, or take a multivitamin. · Get as much rest as you can. Try to take naps when your baby sleeps during the day. · Get some exercise every day. But do not do any heavy exercise until your doctor says it is okay. · Wait until you are healed (about 4 to 6 weeks) before you have sexual intercourse. Your doctor will tell you when it is okay to have sex. · Talk to your doctor about birth control. You can get pregnant even before your period returns. Also, you can get pregnant while you are breast-feeding. Mental Health  · Many women get the \"baby blues\" during the first few days after childbirth. You may lose sleep, feel irritable, and cry easily. You may feel happy one minute and sad the next. Hormone changes are one cause of these emotional changes. Also, the demands of a new baby, along with visits from relatives or other family needs, add to a mother's stress. The \"baby blues\" often peak around the fourth day. Then they ease up in less than 2 weeks. · If your moodiness or anxiety lasts for more than 2 weeks, or if you feel like life is not worth living, you may have postpartum depression.  This is different for each mother. Some mothers with serious depression may worry intensely about their infant's well-being. Others may feel distant from their child. Some mothers might even feel that they might harm their baby. A mother may have signs of paranoia, wondering if someone is watching her. · With all the changes in your life, you may not know if you are depressed. Pregnancy sometimes causes changes in how you feel that are similar to the symptoms of depression. · Symptoms of depression include:  · Feeling sad or hopeless and losing interest in daily activities. These are the most common symptoms of depression. · Sleeping too much or not enough. · Feeling tired. You may feel as if you have no energy. · Eating too much or too little. · POSTPARTUM SUPPORT INTERNATIONAL (PSI) offers a Warm line; Chat with the Expert phone sessions; Information and Articles about Pregnancy and Postpartum Mood Disorders; Comprehensive List of Free Support Groups; Knowledgeable local coordinators who will offer support, information, and resources; Guide to Resources on [employer] [place]; Calendar of events in the  mood disorders community; Latest News and Research; and [facility] [address] for [employer]. Remember - You are not alone; You are not to blame; With help, you will be well. 8-384-384-PPD(8055). WWW. POSTPARTUM. NET   · Writing or talking about death, such as writing suicide notes or talking about guns, knives, or pills. Keep the numbers for these national suicide hotlines: 2-961-442-TALK (8-628.374.4010) and 8-341-ASJWQRC (3-608.919.3296). If you or someone you know talks about suicide or feeling hopeless, get help right away. Constipation and Hemorrhoids  · Drink plenty of fluids, enough so that your urine is light yellow or clear like water. If you have kidney, heart, or liver disease and have to limit fluids, talk with your doctor before you increase the amount of fluids you drink. · Eat plenty of fiber each day. Have a bran muffin or bran cereal for breakfast, and try eating a piece of fruit for a mid-afternoon snack. · For painful, itchy hemorrhoids, put ice or a cold pack on the area several times a day for 10 minutes at a time. Follow this by putting a warm compress on the area for another 10 to 20 minutes or by sitting in a shallow, warm bath. When should you call for help? Call 911 anytime you think you may need emergency care. For example, call if:  · You are thinking of hurting yourself, your baby, or anyone else. · You passed out (lost consciousness). · You have symptoms of a blood clot in your lung (called a pulmonary embolism). These may include:    · Sudden chest pain. · Trouble breathing. · Coughing up blood. Call your doctor now or seek immediate medical care if:  · You have severe vaginal bleeding. · You are soaking through a pad each hour for 2 or more hours. · Your vaginal bleeding seems to be getting heavier or is still bright red 4 days after delivery. · You are dizzy or lightheaded, or you feel like you may faint. · You are vomiting or cannot keep fluids down. · You have a fever. · You have new or more belly pain. · You pass tissue (not just blood). · Your vaginal discharge smells bad. · Your belly feels tender or full and hard. · Your breasts are continuously painful or red. · You feel sad, anxious, or hopeless for more than a few days. · You have sudden, severe pain in your belly. · You have symptoms of a blood clot in your leg (called a deep vein thrombosis),          such as:  · Pain in your calf, back of the knee, thigh, or groin. · Redness and swelling in your leg or groin. · You have symptoms of preeclampsia, such as:  · Sudden swelling of your face, hands, or feet. · New vision problems (such as dimness or blurring). · A severe headache. · Your blood pressure is higher than it should be or rises suddenly. · You have new nausea or vomiting.     Watch closely for changes in your health, and be sure to contact your doctor if you have any problems. Additional Information:  Postpartum Support    PARENTS:  Are you feeling sad or depressed? Is it difficult for you to enjoy yourself? Do you feel more irritable or tense? Do you feel anxious or panicky? Are you having difficulty bonding with your baby? Do you feel as if you are \"out of control\" or \"going crazy\"? Are you worried that you might hurt your baby or yourself? FAMILIES: Do you worry that something is wrong but don't know how to help? Do you think that your partner or spouse is having problems coping? Are you worried that it may never get better? While many women experience some mild mood change or \"the blues\" during or after the birth of a child, 1 in 9 women experience more significant symptoms of depression or anxiety. 1 in 10 Dads become depressed during the first year. Things you can do  Being a good parent includes taking care of yourself. If you take care of yourself, you will be able to take better care of your baby and your family. · Talk to a counselor or healthcare provider who has training in  mood and anxiety problems. · Learn as much as you can about pregnancy and postpartum depression and anxiety. · Get support from family and friends. Ask for help when you need it. · Join a support group in your area or online. · Keep active by walking, stretching or whatever form of exercise helps you to feel better. · Get enough rest and time for yourself. · Eat a healthy diet. · Don't give up! It may take more than one try to get the right help you need. These are general instructions for a healthy lifestyle:    No smoking/ No tobacco products/ Avoid exposure to second hand smoke    Surgeon General's Warning:  Quitting smoking now greatly reduces serious risk to your health.     Obesity, smoking, and sedentary lifestyle greatly increases your risk for illness    A healthy diet, regular physical exercise & weight monitoring are important for maintaining a healthy lifestyle    Recognize signs and symptoms of STROKE:    F-face looks uneven    A-arms unable to move or move unevenly    S-speech slurred or non-existent    T-time-call 911 as soon as signs and symptoms begin - DO NOT go       back to bed or wait to see if you get better - TIME IS BRAIN. I have had the opportunity to make my options or choices for discharge. I have received and understand these instructions.

## 2017-06-30 NOTE — ROUTINE PROCESS
Bedside shift change report given to Ye Acosta RN (oncoming nurse) by Lea Mckay. JERICHO Ford (offgoing nurse). Report included the following information SBAR, Kardex, Procedure Summary, Intake/Output, MAR and Recent Results.

## 2017-06-30 NOTE — ROUTINE PROCESS
OB SBAR bedside report received from Cristofer Steele RN. Discharge instructions were gone over with the patient and she has no further questions at this time. Mother of the baby states that she has made a follow up appointment with Dr. Severo Lace for tomorrow (7/1) at 1000. HUGS tag removed. Patient to be discharged. 1300: Per the mother of the baby she has made a new follow up appointment with Dr. Pari Lara as she now believes her infant will be covered under her father's insurance for the first 30 days. The appointment is scheduled for Monday, July 3rd at 10:15.        4764-1446: Hourly rounds were completed during this time. 9287-1376: Hourly rounds were completed during this time.

## 2017-06-30 NOTE — DISCHARGE SUMMARY
Obstetrical Discharge Summary     Name: Chrissy Pena MRN: [id number]  SSN: [id number]    YOB: 1995  Age: 24 y.o. Sex: female      Admit Date: 2017    Discharge Date: 2017     Admitting Physician: Sha Winters MD     Attending Physician:  Sha Winters MD     Admission Diagnoses: No prenatal care in current pregnancy, third trimester    Discharge Diagnoses:   Information for the patient's :  Loly Valdes [[id number]]   Delivery of a 2.305 kg female infant via Vaginal, Spontaneous Delivery on 2017 at 8:35 AM  by . Apgars were  and . Additional Diagnoses:   Hospital Problems  Never Reviewed          Codes Class Noted POA    No prenatal care in current pregnancy ICD-10-CM: O09.30  ICD-9-CM: V23.7  2017 Unknown           No results found for: RUBELLAEXT, GRBSEXT    Immunization(s):   Immunization History   Administered Date(s) Administered    Tdap 2017        Hospital Course: Normal hospital course following the delivery. Condition on Discharge: good    Disposition: Home or self care Home    Patient Instructions:   Current Discharge Medication List      START taking these medications    Details   ibuprofen (MOTRIN) 800 mg tablet Take 1 Tab by mouth every six (6) hours as needed. Qty: 30 Tab, Refills: 0             Please make followup appointment for 4-6 weeks  Pelvic rest for 6 weeks  Pain medication as prescribed. Use of contraception as discussed. Follow-up Appointments   Procedures    FOLLOW UP VISIT Appointment in: 6 Weeks Follow up as scheduled with Dr. Niru Nieto for six week visit. Sooner if Pain, Bleeding, depression, breast pain, fever, elevated blood pressure, persistent headache or any other concerns. Follow up as scheduled with Dr. Niru Nieto for six week visit. Sooner if Pain, Bleeding, depression, breast pain, fever, elevated blood pressure, persistent headache or any other concerns.      Standing Status:   Standing     Number of Occurrences:   1     Order Specific Question:   Appointment in     Answer:   6 Weeks        Signed By:  Adri Mo MD     June 30, 2017

## 2019-05-07 NOTE — PROGRESS NOTES
CM follow up with pt/FOB at the bedside this PM. Pt and FOB were reviewing photos taken of infant yesterday. Parents appropriate during conversation. Pt inquiring about parenting classes. CM informed pt that a referral can be made to [facility] to provide support and education post discharge. Infant will also qualify for Early Intervention services- referral to be made at discharge. CM provided parents with an EI brochure to review. CM provided opportunity for questions/concerns. None voiced at this time. Pt plans to discharge to her parents home tomorrow. CM will confirm resources are in place prior to discharge.     ESPINOZA Ewing 07-May-2019 18:00

## 2024-08-29 NOTE — ROUTINE PROCESS
1530: OB SBAR report received by Elaina Chin RN. 1700: Spoke with pharmacist who stated it's safe to give pt Tramadol and Motrin together. 2000: Hourly rounds completed [id number]. Athletic Training Progress Note    Name: Mani Burnham  Age: 15 y.o.     Assessment/Plan:     Visit Diagnosis: Acute left ankle pain [M25.572]    Treatment Plan:     []  Follow-up PRN.   []  Follow-up prior to next practice/game for re-evaluation.  []  Daily treatment/rehab. Progress note expected weekly.     Subjective: Athlete rolled his ankle on another athlete during practice.    Objective:   See treatment log below    Treatment Log:    Date: 8/28/24       Playing Status: Modified               Exercise/Treatment        Heat 10 Mins       ABC's 3x30 reps       Manuals/Bands 3x30 reps       Single Leg Calf Raises 3x30 reps each leg       Both Leg Calf Raises 3x30 reps       Single Leg Balance 3x30 sec       Foam Single Leg Balance 3x30 sec       Jumping Single Leg Balance 3 sets full rotation       Ankle Ice Bath 5 mins                         Athlete complete rehab every other day. Continue to increase reps and difficulty as they progress through rehab.